# Patient Record
Sex: MALE | Race: WHITE
[De-identification: names, ages, dates, MRNs, and addresses within clinical notes are randomized per-mention and may not be internally consistent; named-entity substitution may affect disease eponyms.]

---

## 2020-03-07 ENCOUNTER — HOSPITAL ENCOUNTER (EMERGENCY)
Dept: HOSPITAL 88 - ER | Age: 20
LOS: 1 days | Discharge: HOME | End: 2020-03-08
Payer: COMMERCIAL

## 2020-03-07 VITALS — BODY MASS INDEX: 24.52 KG/M2 | WEIGHT: 185 LBS | HEIGHT: 73 IN

## 2020-03-07 VITALS — DIASTOLIC BLOOD PRESSURE: 76 MMHG | SYSTOLIC BLOOD PRESSURE: 124 MMHG
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
## 2020-03-07 DIAGNOSIS — S00.83XA: ICD-10-CM

## 2020-03-07 DIAGNOSIS — S00.81XA: ICD-10-CM

## 2020-03-07 DIAGNOSIS — S06.0X1A: Primary | ICD-10-CM
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
## 2020-03-07 PROCEDURE — 70450 CT HEAD/BRAIN W/O DYE: CPT

## 2020-03-07 PROCEDURE — 70486 CT MAXILLOFACIAL W/O DYE: CPT

## 2020-03-07 PROCEDURE — 99283 EMERGENCY DEPT VISIT LOW MDM: CPT

## 2020-03-07 PROCEDURE — 72125 CT NECK SPINE W/O DYE: CPT
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
